# Patient Record
Sex: FEMALE | Employment: UNEMPLOYED | URBAN - METROPOLITAN AREA
[De-identification: names, ages, dates, MRNs, and addresses within clinical notes are randomized per-mention and may not be internally consistent; named-entity substitution may affect disease eponyms.]

---

## 2017-01-27 ENCOUNTER — OFFICE VISIT (OUTPATIENT)
Dept: DERMATOLOGY | Facility: CLINIC | Age: 22
End: 2017-01-27

## 2017-01-27 ENCOUNTER — TELEPHONE (OUTPATIENT)
Dept: DERMATOLOGY | Facility: CLINIC | Age: 22
End: 2017-01-27

## 2017-01-27 DIAGNOSIS — L70.0 ACNE VULGARIS: Primary | ICD-10-CM

## 2017-01-27 RX ORDER — TRETINOIN 0.25 MG/G
CREAM TOPICAL
Qty: 45 G | Refills: 3 | Status: SHIPPED | OUTPATIENT
Start: 2017-01-27 | End: 2021-11-10

## 2017-01-27 RX ORDER — CLINDAMYCIN PHOSPHATE 10 UG/ML
LOTION TOPICAL 2 TIMES DAILY
Qty: 60 ML | Refills: 3 | Status: SHIPPED | OUTPATIENT
Start: 2017-01-27 | End: 2021-11-10

## 2017-01-27 ASSESSMENT — PAIN SCALES - GENERAL: PAINLEVEL: MODERATE PAIN (5)

## 2017-01-27 NOTE — TELEPHONE ENCOUNTER
Prior Authorization Retail Medication Request  Medication/Dose: Tretinoin 0.025% cream 45gm  Diagnosis and ICD code: Acne Vulgaris L70.0  New/Renewal/Insurance Change PA: New  Previously Tried and Failed Therapies: Unsure of names of products. (see note)    Insurance ID (if provided):   Subscriber: [id number] JAROCHO HANNAH       Rel to sub: 01 - Self       Member ID: [id number]       Payor: [facility] Ph: [phone]       Benefit plan: 16[facility]       Group number: [id number]       Member effective dates: from 02/01/16        Insurance Phone (if provided):     Any additional info from fax request:     If you received a fax notification from an outside Pharmacy:  Pharmacy Name:[facility]   Pharmacy #:[phone]  Pharmacy Fax:[phone]

## 2017-01-27 NOTE — Clinical Note
1/27/2017       RE: Kee Jovel  [address], [place] [address]     Dear Colleague,    Thank you for referring your patient, Kee Jovel, to the [facility] DERMATOLOGY at [facility]. Please see a copy of my visit note below.    [facility] Dermatology Note    Dermatology Problem List:  1. Acne vulgaris, mixed mild   -Tretinoin 0.025% cream started 1/27/17  -Topical clindamycin 1% lotion started 1/27/17  -Benzyl peroxide wash started 1/27/17    CC:   Chief Complaint   Patient presents with     Derm Problem     Kee is here today for acne and acne scars     Date of Service: Jan 27, 2017    History of Present Illness:  Ms. Kee Jovel is a 21 year old female who presents for an evaluation of acne and acne scars. Today the patient reports an approximately two year history of acne affecting her face. This acne has been worsening over the last 2-3 months. She has not seen dermatology in the past, but was treated for acne by her PCP in 06/2016. She is unsure what prescriptions she used, but from her description it sounds like a topical retinoid and antibiotic. She used these medications for one month before discontinuing due to side effects including dryness and a burning sensation. The patient also reports that her acne is exacerbated with her menstrual cycle. The patient reports no other lesions of concern at this time.      Past Medical History:   There is no problem list on file for this patient.    No past medical history on file.  No past surgical history on file.  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    Family History:  Sister also with acne    Medications:  No current outpatient prescriptions on file.     Allergies:  No Known Allergies  Review of Systems:  - Skin: As above in HPI. No additional skin concerns.    Physical exam:  Vitals: There were no vitals taken for this visit.  GEN: This is a well developed,  well-nourished female in no acute distress, in a pleasant mood.    SKIN: Focused examination of the face and neck was performed.  - Open and close comedones scattered across cheeks, chin, and forehead  - 2-3 small erythematous papules on chin and forehead  - No other lesions of concern on areas examined.     Impression/Plan:  1. Acne vulgaris, mild: primarily comedonal but with minimal inflammatory component  - Start tretinoin 0.025% cream  - Start clindamycin 1% lotion  - Start benzyl peroxide wash    Follow-up in 3 months, earlier for new or changing lesions    This note was scribed by Augustine Velázquez, MS4, on behalf of Dr. Epi Kapoor MD.    Staff attestation:  The documentation recorded by the scribe accurately reflects the services I personally performed and the decisions I personally made.    Epi Kapoor MD  Staff Dermatologist    Department of Dermatology

## 2017-01-27 NOTE — MR AVS SNAPSHOT
After Visit Summary   1/27/2017    Kee Jovel    MRN: [id number]           Patient Information     Date Of Birth          1995        Visit Information        Provider Department      1/27/2017 8:45 AM Epi Kapoor MD [facility] Dermatology        Today's Diagnoses     Acne vulgaris    -  1       Care Instructions    -To correctly use the tretinoin cream: use a gentle cleanser at the end of the day (like Cetaphil) and pat the skin dry. Do not apply the tretinoin to a wet face. Apply a pea sized amount of the cream evenly across the face. More is not better!   -For the first two weeks, only apply the cream every other night. After that you can use it every night   -It might take up to 3 months to see improvement.  -Start using a benzyl peroxide face wash strength 4-5% every morning or every other morning  -Using a sunscreen regularly will help decrease the darkening of spots on your face.   -Start applying the topical clindamycin every morning  -Return to follow-up in 3 months.        Follow-ups after your visit        Your next 10 appointments already scheduled     Apr 28, 2017  3:00 PM   (Arrive by 2:45 PM)   Return Visit with Epi Kapoor MD   [facility] Dermatology ([facility])    [address] [place] [id number] [phone]              Who to contact     Please call your clinic at [phone] to:    Ask questions about your health    Make or cancel appointments    Discuss your medicines    Learn about your test results    Speak to your doctor   If you have compliments or concerns about an experience at your clinic, or if you wish to file a complaint, please contact [facility] Patient Relations at [phone] or email us at [email or web address]         Additional Information About Your Visit        Care EveryWhere ID     This is your Care EveryWhere ID. This could be used by other  organizations to access your [facility] medical records  [id number]         Blood Pressure from Last 3 Encounters:   No data found for BP    Weight from Last 3 Encounters:   No data found for Wt              Today, you had the following     No orders found for display         Today's Medication Changes          These changes are accurate as of: 1/27/17  8:56 AM.  If you have any questions, ask your nurse or doctor.               Start taking these medicines.        Dose/Directions    clindamycin 1 % lotion   Commonly known as:  CLEOCIN T   Used for:  Acne vulgaris   Started by:  Epi Kapoor MD        Apply topically 2 times daily   Quantity:  60 mL   Refills:  3       tretinoin 0.025 % cream   Commonly known as:  RETIN-A   Used for:  Acne vulgaris   Started by:  Epi Kapoor MD        Apply to clean dry face every other night for two weeks, then nightly thereafter.   Quantity:  45 g   Refills:  3            Where to get your medicines      These medications were sent to [id number] - [facility] [address] [address]     Phone:  [phone]    - clindamycin 1 % lotion  - tretinoin 0.025 % cream             Primary Care Provider    No Pcp Confirmed       No address on file        Thank you!     Thank you for choosing [facility] DERMATOLOGY  for your care. Our goal is always to provide you with excellent care. Hearing back from our patients is one way we can continue to improve our services. Please take a few minutes to complete the written survey that you may receive in the mail after your visit with us. Thank you!             Your Updated Medication List - Protect others around you: Learn how to safely use, store and throw away your medicines at www.disposemymeds.org.          This list is accurate as of: 1/27/17  8:56 AM.  Always use your most recent med list.                   Brand Name Dispense Instructions for use    clindamycin 1 % lotion    CLEOCIN T    60 mL     Apply topically 2 times daily       tretinoin 0.025 % cream    RETIN-A    45 g    Apply to clean dry face every other night for two weeks, then nightly thereafter.

## 2017-01-27 NOTE — NURSING NOTE
Dermatology Rooming Note    Kee Jovel's goals for this visit include:   Chief Complaint   Patient presents with     Derm Problem     Kee is here today for acne and acne scars     Angela Nicole LPN

## 2017-01-27 NOTE — PATIENT INSTRUCTIONS
-To correctly use the tretinoin cream: use a gentle cleanser at the end of the day (like Cetaphil) and pat the skin dry. Do not apply the tretinoin to a wet face. Apply a pea sized amount of the cream evenly across the face. More is not better!   -For the first two weeks, only apply the cream every other night. After that you can use it every night   -It might take up to 3 months to see improvement.  -Start using a benzyl peroxide face wash strength 4-5% every morning or every other morning  -Using a sunscreen regularly will help decrease the darkening of spots on your face.   -Start applying the topical clindamycin every morning  -Return to follow-up in 3 months.

## 2017-01-27 NOTE — PROGRESS NOTES
[facility] Dermatology Note    Dermatology Problem List:  1. Acne vulgaris, mixed mild   -Tretinoin 0.025% cream started 1/27/17  -Topical clindamycin 1% lotion started 1/27/17  -Benzyl peroxide wash started 1/27/17    CC:   Chief Complaint   Patient presents with     Derm Problem     Kee is here today for acne and acne scars     Date of Service: Jan 27, 2017    History of Present Illness:  Ms. Kee Jovel is a 21 year old female who presents for an evaluation of acne and acne scars. Today the patient reports an approximately two year history of acne affecting her face. This acne has been worsening over the last 2-3 months. She has not seen dermatology in the past, but was treated for acne by her PCP in 06/2016. She is unsure what prescriptions she used, but from her description it sounds like a topical retinoid and antibiotic. She used these medications for one month before discontinuing due to side effects including dryness and a burning sensation. The patient also reports that her acne is exacerbated with her menstrual cycle. The patient reports no other lesions of concern at this time.      Past Medical History:   There is no problem list on file for this patient.    No past medical history on file.  No past surgical history on file.  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    Family History:  Sister also with acne    Medications:  No current outpatient prescriptions on file.     Allergies:  No Known Allergies  Review of Systems:  - Skin: As above in HPI. No additional skin concerns.    Physical exam:  Vitals: There were no vitals taken for this visit.  GEN: This is a well developed, well-nourished female in no acute distress, in a pleasant mood.    SKIN: Focused examination of the face and neck was performed.  - Open and close comedones scattered across cheeks, chin, and forehead  - 2-3 small erythematous papules on chin and forehead  - No other lesions of  concern on areas examined.     Impression/Plan:  1. Acne vulgaris, mild: primarily comedonal but with minimal inflammatory component  - Start tretinoin 0.025% cream  - Start clindamycin 1% lotion  - Start benzyl peroxide wash    Follow-up in 3 months, earlier for new or changing lesions    This note was scribed by Augustine Velázquez, MS4, on behalf of Dr. Epi Kapoor MD.    Staff attestation:  The documentation recorded by the scribe accurately reflects the services I personally performed and the decisions I personally made.    Epi Kapoor MD  Staff Dermatologist    Department of Dermatology

## 2017-02-16 NOTE — TELEPHONE ENCOUNTER
[facility] Prior Authorization Team   Phone: [phone]  Fax: [phone]      PA Initiation    Medication: Tretinoin 0.025% cream 45gm  Insurance Company: [employer] - Phone [phone] Fax [phone]  Pharmacy Filling the Rx: [id number] - [place], [place] - [address]  Filling Pharmacy Phone: [phone]  Filling Pharmacy Fax: [phone]  Start Date: 2/16/2017

## 2017-02-20 NOTE — TELEPHONE ENCOUNTER
Prior Authorization Approval    Authorization Effective Date: 2/17/2017  Authorization Expiration Date: 8/16/2017  Medication: Tretinoin 0.025% cream 45gm - Approved   Approved Dose/Quantity:   Reference #:     Insurance Company: [employer] - Phone [phone] Fax [phone]  Expected CoPay: 1.00     CoPay Card Available:      Foundation Assistance Needed:    Which Pharmacy is filling the prescription (Not needed for infusion/clinic administered): [id number] - [place] - [address]  Pharmacy Notified: Yes  Patient Notified: Yes

## 2019-09-17 ENCOUNTER — HOSPITAL ENCOUNTER (EMERGENCY)
Facility: CLINIC | Age: 24
Discharge: HOME | End: 2019-09-18
Attending: EMERGENCY MEDICINE | Admitting: EMERGENCY MEDICINE
Payer: COMMERCIAL

## 2019-09-17 DIAGNOSIS — J32.9 SINUSITIS, UNSPECIFIED CHRONICITY, UNSPECIFIED LOCATION: ICD-10-CM

## 2019-09-17 PROBLEM — Z22.7 LATENT TUBERCULOSIS: Status: ACTIVE | Noted: 2017-07-12

## 2019-09-17 PROBLEM — K21.9 GASTROESOPHAGEAL REFLUX DISEASE: Status: ACTIVE | Noted: 2019-02-25

## 2019-09-17 LAB
ALBUMIN UR-MCNC: NEGATIVE MG/DL
ANION GAP SERPL CALCULATED.3IONS-SCNC: 7 MMOL/L (ref 3–14)
APPEARANCE UR: CLEAR
BASOPHILS # BLD AUTO: 0 10E9/L (ref 0–0.2)
BASOPHILS NFR BLD AUTO: 0.2 %
BILIRUB UR QL STRIP: NEGATIVE
BUN SERPL-MCNC: 15 MG/DL (ref 7–30)
CALCIUM SERPL-MCNC: 8.7 MG/DL (ref 8.5–10.1)
CHLORIDE SERPL-SCNC: 104 MMOL/L (ref 94–109)
CO2 SERPL-SCNC: 26 MMOL/L (ref 20–32)
COLOR UR AUTO: NORMAL
CREAT SERPL-MCNC: 0.72 MG/DL (ref 0.52–1.04)
DEPRECATED S PYO AG THROAT QL EIA: NORMAL
DIFFERENTIAL METHOD BLD: NORMAL
EOSINOPHIL # BLD AUTO: 0.1 10E9/L (ref 0–0.7)
EOSINOPHIL NFR BLD AUTO: 0.9 %
ERYTHROCYTE [DISTWIDTH] IN BLOOD BY AUTOMATED COUNT: 12.8 % (ref 10–15)
GFR SERPL CREATININE-BSD FRML MDRD: >90 ML/MIN/{1.73_M2}
GLUCOSE SERPL-MCNC: 86 MG/DL (ref 70–99)
GLUCOSE UR STRIP-MCNC: NEGATIVE MG/DL
HCG UR QL: NEGATIVE
HCT VFR BLD AUTO: 39 % (ref 35–47)
HETEROPH AB SER QL: NEGATIVE
HGB BLD-MCNC: 12.7 G/DL (ref 11.7–15.7)
HGB UR QL STRIP: NEGATIVE
IMM GRANULOCYTES # BLD: 0 10E9/L (ref 0–0.4)
IMM GRANULOCYTES NFR BLD: 0.4 %
INTERNAL QC OK POCT: YES
KETONES UR STRIP-MCNC: NEGATIVE MG/DL
LEUKOCYTE ESTERASE UR QL STRIP: NEGATIVE
LYMPHOCYTES # BLD AUTO: 2.5 10E9/L (ref 0.8–5.3)
LYMPHOCYTES NFR BLD AUTO: 25.6 %
MCH RBC QN AUTO: 29.4 PG (ref 26.5–33)
MCHC RBC AUTO-ENTMCNC: 32.6 G/DL (ref 31.5–36.5)
MCV RBC AUTO: 90 FL (ref 78–100)
MONOCYTES # BLD AUTO: 0.6 10E9/L (ref 0–1.3)
MONOCYTES NFR BLD AUTO: 6.2 %
NEUTROPHILS # BLD AUTO: 6.6 10E9/L (ref 1.6–8.3)
NEUTROPHILS NFR BLD AUTO: 66.7 %
NITRATE UR QL: NEGATIVE
NRBC # BLD AUTO: 0 10*3/UL
NRBC BLD AUTO-RTO: 0 /100
PH UR STRIP: 6.5 PH (ref 5–7)
PLATELET # BLD AUTO: 310 10E9/L (ref 150–450)
POTASSIUM SERPL-SCNC: 3.4 MMOL/L (ref 3.4–5.3)
RBC # BLD AUTO: 4.32 10E12/L (ref 3.8–5.2)
RBC #/AREA URNS AUTO: 0 /HPF (ref 0–2)
SODIUM SERPL-SCNC: 137 MMOL/L (ref 133–144)
SOURCE: NORMAL
SP GR UR STRIP: 1.02 (ref 1–1.03)
SPECIMEN SOURCE: NORMAL
SQUAMOUS #/AREA URNS AUTO: 1 /HPF (ref 0–1)
UROBILINOGEN UR STRIP-MCNC: NORMAL MG/DL (ref 0–2)
WBC # BLD AUTO: 9.8 10E9/L (ref 4–11)
WBC #/AREA URNS AUTO: <1 /HPF (ref 0–5)

## 2019-09-17 PROCEDURE — 86308 HETEROPHILE ANTIBODY SCREEN: CPT | Performed by: EMERGENCY MEDICINE

## 2019-09-17 PROCEDURE — 87081 CULTURE SCREEN ONLY: CPT | Performed by: EMERGENCY MEDICINE

## 2019-09-17 PROCEDURE — 99284 EMERGENCY DEPT VISIT MOD MDM: CPT | Mod: Z6 | Performed by: EMERGENCY MEDICINE

## 2019-09-17 PROCEDURE — 80048 BASIC METABOLIC PNL TOTAL CA: CPT | Performed by: EMERGENCY MEDICINE

## 2019-09-17 PROCEDURE — 96374 THER/PROPH/DIAG INJ IV PUSH: CPT

## 2019-09-17 PROCEDURE — 99284 EMERGENCY DEPT VISIT MOD MDM: CPT | Mod: 25

## 2019-09-17 PROCEDURE — 81025 URINE PREGNANCY TEST: CPT | Performed by: EMERGENCY MEDICINE

## 2019-09-17 PROCEDURE — 87880 STREP A ASSAY W/OPTIC: CPT | Performed by: EMERGENCY MEDICINE

## 2019-09-17 PROCEDURE — 25000128 H RX IP 250 OP 636: Performed by: EMERGENCY MEDICINE

## 2019-09-17 PROCEDURE — 96375 TX/PRO/DX INJ NEW DRUG ADDON: CPT

## 2019-09-17 PROCEDURE — 96361 HYDRATE IV INFUSION ADD-ON: CPT

## 2019-09-17 PROCEDURE — 85025 COMPLETE CBC W/AUTO DIFF WBC: CPT | Performed by: EMERGENCY MEDICINE

## 2019-09-17 PROCEDURE — 81001 URINALYSIS AUTO W/SCOPE: CPT | Performed by: EMERGENCY MEDICINE

## 2019-09-17 RX ORDER — KETOROLAC TROMETHAMINE 15 MG/ML
15 INJECTION, SOLUTION INTRAMUSCULAR; INTRAVENOUS ONCE
Status: COMPLETED | OUTPATIENT
Start: 2019-09-17 | End: 2019-09-17

## 2019-09-17 RX ORDER — AZITHROMYCIN 250 MG/1
TABLET, FILM COATED ORAL
Qty: 6 TABLET | Refills: 0 | Status: SHIPPED | OUTPATIENT
Start: 2019-09-17 | End: 2019-09-22

## 2019-09-17 RX ORDER — ONDANSETRON 2 MG/ML
4 INJECTION INTRAMUSCULAR; INTRAVENOUS ONCE
Status: COMPLETED | OUTPATIENT
Start: 2019-09-17 | End: 2019-09-17

## 2019-09-17 RX ADMIN — ONDANSETRON 4 MG: 2 INJECTION INTRAMUSCULAR; INTRAVENOUS at 22:53

## 2019-09-17 RX ADMIN — SODIUM CHLORIDE 1000 ML: 9 INJECTION, SOLUTION INTRAVENOUS at 22:42

## 2019-09-17 RX ADMIN — KETOROLAC TROMETHAMINE 15 MG: 15 INJECTION, SOLUTION INTRAMUSCULAR; INTRAVENOUS at 22:42

## 2019-09-17 ASSESSMENT — ENCOUNTER SYMPTOMS
DYSURIA: 0
NAUSEA: 1
HEADACHES: 1
DIARRHEA: 0
ABDOMINAL PAIN: 0
VOMITING: 0
COUGH: 1

## 2019-09-17 NOTE — ED AVS SNAPSHOT
[facility], [place], Emergency Department  [address] [address]  Phone:  [phone]                                    Kee Jovel   MRN: [id number]    Department:  [facility], Emergency Department   Date of Visit:  9/17/2019           After Visit Summary Signature Page    I have received my discharge instructions, and my questions have been answered. I have discussed any challenges I see with this plan with the nurse or doctor.    ..........................................................................................................................................  Patient/Patient Representative Signature      ..........................................................................................................................................  Patient Representative Print Name and Relationship to Patient    ..................................................               ................................................  Date                                   Time    ..........................................................................................................................................  Reviewed by Signature/Title    ...................................................              ..............................................  Date                                               Time          22EPIC Rev 08/18

## 2019-09-18 VITALS
SYSTOLIC BLOOD PRESSURE: 115 MMHG | OXYGEN SATURATION: 100 % | DIASTOLIC BLOOD PRESSURE: 80 MMHG | HEART RATE: 79 BPM | TEMPERATURE: 97.9 F | WEIGHT: 189 LBS | RESPIRATION RATE: 16 BRPM

## 2019-09-18 NOTE — ED TRIAGE NOTES
Pt arrives with complaints of body aches, headache, abdominal pain & nausea, and sweating since Sunday. Denies diarrhea or vomiting. She is currently on antibiotics (amoxicillin 500 mg) for a sinus infection. She states she feels feverish but was not sure if she had a fever. She denies chest pain or shortness of breath. Denies possibility of pregnancy. LMP 8/19/19

## 2019-09-18 NOTE — RESULT ENCOUNTER NOTE
Preliminary Beta strep group A r/o culture is PENDING and/or NEGATIVE at this time.   No changes in treatment per [facility] Strep protocol.

## 2019-09-18 NOTE — ED PROVIDER NOTES
[facility] EMERGENCY DEPARTMENT ([facility])  9/17/19  History     Chief Complaint   Patient presents with     Headache     Abdominal Pain     Nausea     The history is provided by the patient and medical records.     Kee Jovel is a 23 year old female with a past medical history significant for latent tuberculosis and gastroesophageal reflux disease who presents to the Emergency Department for evaluation of headache, nausea, congestion, and body aches.    Patient reports that her first symptom was a headache that began 3 weeks ago. Her symptoms then progressed on Sunday to include nasal congestion and a mild cough. She states that she scheduled an urgent care appointment on 9/5/19 to address her symptoms. Per chart, she was seen at [facility] and complained of bilateral ear pain, cough, nasal congestion, and facial pressure. She states that she was diagnosed with a sinus infection and was given a 10-day course of 500 mg augmentin but notes little to no improvement. Patient notes that she began feeling fever-like symptoms around 3:00 PM this afternoon accompanied with nausea. She took a Tylenol but observed little to no improvement so she decided to visit the ED.         Patient denies any vomiting, dysuria, abdominal pain, or diarrhea. She denies having any sick contacts or going on any travels abroad.     PAST MEDICAL HISTORY: History reviewed. No pertinent past medical history.    PAST SURGICAL HISTORY: History reviewed. No pertinent surgical history.    FAMILY HISTORY:   Family History   Problem Relation Age of Onset     Melanoma No family hx of      Skin Cancer No family hx of        SOCIAL HISTORY:   Social History     Tobacco Use     Smoking status: Never Smoker     Smokeless tobacco: Never Used   Substance Use Topics     Alcohol use: Not on file       Discharge Medication List as of 9/17/2019 11:54 PM      START taking these medications    Details   azithromycin (ZITHROMAX Z-JAZZY) 250 MG tablet Two  tablets on the first day, then one tablet daily for the next 4 days, Disp-6 tablet, R-0, Local Print         CONTINUE these medications which have NOT CHANGED    Details   amoxicillin-clavulanate (AUGMENTIN) 875-125 MG tablet TAKE ONE TABLET BY MOUTH TWICE A DAY FOR 10 DAYS, Historical      clindamycin (CLEOCIN T) 1 % lotion Apply topically 2 times dailyDisp-60 mL, [id number]      tretinoin (RETIN-A) 0.025 % cream Apply to clean dry face every other night for two weeks, then nightly thereafter.Disp-45 g, [id number]                Allergies   Allergen Reactions     Pollen Extract Other (See Comments)         I have reviewed the Medications, Allergies, Past Medical and Surgical History, and Social History in the Epic system.    Review of Systems   HENT: Positive for congestion.    Respiratory: Positive for cough (mild).    Gastrointestinal: Positive for nausea. Negative for abdominal pain, diarrhea and vomiting.   Genitourinary: Negative for dysuria.   Neurological: Positive for headaches.       Physical Exam   BP: 115/80  Pulse: 79  Temp: 97.9  F (36.6  C)  Weight: 85.7 kg (189 lb)  SpO2: 100 %      Physical Exam   Constitutional: She is oriented to person, place, and time. No distress.   HENT:   Head: Normocephalic and atraumatic.   Right Ear: External ear normal.   Left Ear: External ear normal.   Mouth/Throat: Oropharynx is clear and moist.   Eyes: Pupils are equal, round, and reactive to light. Conjunctivae are normal.   Neck: Normal range of motion. Neck supple.   Cardiovascular: Normal rate and intact distal pulses.   Pulmonary/Chest: Effort normal. No respiratory distress. She has no wheezes. She has no rales. She exhibits no tenderness.   Abdominal: Soft. She exhibits no distension. There is no tenderness. There is no rebound and no guarding.   Musculoskeletal: Normal range of motion. She exhibits no edema.   Neurological: She is alert and oriented to person, place, and time.   Skin: Skin is warm and  dry. She is not diaphoretic.   Psychiatric: She has a normal mood and affect. Her behavior is normal. Thought content normal.   Nursing note and vitals reviewed.      ED Course   10:07 PM  The patient was seen and examined by Kimani Pacheco MD in [facility].        Procedures             Critical Care time:  none             Labs Ordered and Resulted from Time of ED Arrival Up to the Time of Departure from the ED   HCG QUAL URINE POCT - Normal   ROUTINE UA WITH MICROSCOPIC            Assessments & Plan (with Medical Decision Making)   This is a 23-year-old female who presents with symptoms of malaise and congestion for the past several weeks.  She complained of a headache but her examination was benign with no evidence of serious bacterial infection.  Her lab evaluation was normal with no leukocytosis.  She was treated with normal saline bolus and IV Toradol and felt improved.  Given her ongoing symptoms we will treat with Azithromycin for sinusitis.     I have reviewed the nursing notes.    I have reviewed the findings, diagnosis, plan and need for follow up with the patient.    New Prescriptions    No medications on file       Final diagnoses:   None     Jamila SALTER, am serving as a trained medical scribe to document services personally performed by Kimani Pacheco MD, based on the provider's statements to me.      IKimani MD, was physically present and have reviewed and verified the accuracy of this note documented by Jamila Ricardo.     9/17/2019   [facility], [place], EMERGENCY DEPARTMENT     Kimani Pacheco MD  09/18/19 [id number]

## 2019-09-20 LAB
BACTERIA SPEC CULT: NORMAL
Lab: NORMAL
SPECIMEN SOURCE: NORMAL

## 2021-06-07 ENCOUNTER — TRANSFERRED RECORDS (OUTPATIENT)
Dept: HEALTH INFORMATION MANAGEMENT | Facility: CLINIC | Age: 26
End: 2021-06-07
Payer: COMMERCIAL

## 2021-06-08 ENCOUNTER — OFFICE VISIT (OUTPATIENT)
Dept: PEDIATRICS | Facility: CLINIC | Age: 26
End: 2021-06-08
Payer: COMMERCIAL

## 2021-06-08 ENCOUNTER — ANCILLARY PROCEDURE (OUTPATIENT)
Dept: GENERAL RADIOLOGY | Facility: CLINIC | Age: 26
End: 2021-06-08
Attending: PHYSICIAN ASSISTANT
Payer: COMMERCIAL

## 2021-06-08 VITALS
HEART RATE: 103 BPM | DIASTOLIC BLOOD PRESSURE: 70 MMHG | WEIGHT: 226.4 LBS | SYSTOLIC BLOOD PRESSURE: 114 MMHG | RESPIRATION RATE: 14 BRPM | TEMPERATURE: 99.4 F | OXYGEN SATURATION: 100 %

## 2021-06-08 DIAGNOSIS — M54.50 LUMBAR PAIN: ICD-10-CM

## 2021-06-08 DIAGNOSIS — M25.562 ACUTE PAIN OF LEFT KNEE: Primary | ICD-10-CM

## 2021-06-08 DIAGNOSIS — M54.2 NECK PAIN: ICD-10-CM

## 2021-06-08 DIAGNOSIS — M25.562 ACUTE PAIN OF LEFT KNEE: ICD-10-CM

## 2021-06-08 PROCEDURE — 99204 OFFICE O/P NEW MOD 45 MIN: CPT | Performed by: PHYSICIAN ASSISTANT

## 2021-06-08 PROCEDURE — 73562 X-RAY EXAM OF KNEE 3: CPT | Mod: LT | Performed by: RADIOLOGY

## 2021-06-08 NOTE — PROGRESS NOTES
Assessment & Plan   Acute pain of left knee  Tendonitis. ACE bandage. RICE.  - XR Knee Left 3 Views; Future    Lumbar pain  S/p epidural. Follow up with [facility].  - Orthopedic & Spine  Referral; Future    Neck pain  Causing tension headache. Proceed with physical therapy.   - [facility] REFERR[facility]; Future    VANDANA Garcia [facility] CHA Sweet is a 25 year old who presents for the following health issues:    HPI   Musculoskeletal problem/pain  Onset/Duration: x 3 week  Description  Location: knee - left  Joint Swelling: YES  Redness: no  Pain: YES  Warmth: no  Intensity:  6/10  Progression of Symptoms:  same  Accompanying signs and symptoms:   Fevers: no  Numbness/tingling/weakness: no  No locking or giving way of knee  History  Trauma to the area: no  Recent illness:  no  Previous similar problem: no  Previous evaluation:  no  Precipitating or alleviating factors:  Aggravating factors include: overuse and walking or physical exercise.   Therapies tried and outcome: heating pad and tylenol- temporary relief.     Back Pain  Onset/Duration: 5 months ago s/p epidural  Description:   Location of pain: low back along the spine and neck spine   Character of pain: sharp  Pain radiation: none  New numbness or weakness in legs, not attributed to pain: no   Intensity: 8-9/10  Progression of Symptoms: intermittent  History:   Specific cause: Started after delivery of baby  Pain interferes with job: YES  History of back problems: no prior back problems  Any previous MRI or X-rays: None  Sees a specialist for back pain: Yes- seeing physical therapy- at [facility] with Lien Alvarado (PIYUSH)    Alleviating factors:   Improved by: Physical Therapy, heating pad, tylenol   Precipitating factors:  Worsened by: Bending and worse in the AM  Therapies tried and outcome: acetaminophen (Tylenol), heat and Physical Therapy    NECK PAIN: 3 weeks of neck pain. Into scalp. Tension  headache.     Review of Systems   Constitutional, HEENT, cardiovascular, pulmonary, gi and gu systems are negative, except as otherwise noted.      Objective    /70 (BP Location: Right arm, Patient Position: Sitting, Cuff Size: Adult Large)   Pulse 103   Temp 99.4  F (37.4  C) (Tympanic)   Resp 14   Wt 102.7 kg (226 lb 6.4 oz)   SpO2 100%   There is no height or weight on file to calculate BMI.  Physical Exam   ORTHO: Knee Exam: Inspection: AP/lateral alignment normal  Tender: medial distal knee  Active Range of Motion: all normal with pain  Strength: full    Cervical Spine Exam: Inspection: normal cervical lordosis  Tender:  left trapezius muscles, right trapezius muscles insertion  Range of Motion:  Full ROM of cervical spine  Strength: Full strength of all neck muscles    Lumber/Thoracic Spine Exam: Tender:  lumbar spinous processes  Range of Motion: limited due to pain  Strength:  Full    Results for orders placed or performed in visit on 06/08/21   XR Knee Left 3 Views     Status: None    Narrative    KNEE LEFT THREE VIEW   6/8/2021 2:33 PM     HISTORY: Acute pain of left knee.    COMPARISON: None.      Impression    IMPRESSION: Normal joint spacing and alignment. No fracture or  effusion.    NAOMIE CARNES MD

## 2021-06-16 ENCOUNTER — ANCILLARY PROCEDURE (OUTPATIENT)
Dept: GENERAL RADIOLOGY | Facility: CLINIC | Age: 26
End: 2021-06-16
Attending: PHYSICAL MEDICINE & REHABILITATION
Payer: COMMERCIAL

## 2021-06-16 ENCOUNTER — OFFICE VISIT (OUTPATIENT)
Dept: PALLIATIVE MEDICINE | Facility: CLINIC | Age: 26
End: 2021-06-16
Attending: PHYSICIAN ASSISTANT
Payer: COMMERCIAL

## 2021-06-16 VITALS — OXYGEN SATURATION: 98 % | SYSTOLIC BLOOD PRESSURE: 109 MMHG | DIASTOLIC BLOOD PRESSURE: 72 MMHG | HEART RATE: 90 BPM

## 2021-06-16 DIAGNOSIS — M54.2 CHRONIC NECK PAIN: ICD-10-CM

## 2021-06-16 DIAGNOSIS — M54.50 CHRONIC BILATERAL LOW BACK PAIN WITHOUT SCIATICA: ICD-10-CM

## 2021-06-16 DIAGNOSIS — G89.29 CHRONIC BILATERAL LOW BACK PAIN WITHOUT SCIATICA: ICD-10-CM

## 2021-06-16 DIAGNOSIS — G89.29 CHRONIC NECK PAIN: ICD-10-CM

## 2021-06-16 DIAGNOSIS — M79.18 MYOFASCIAL PAIN: Primary | ICD-10-CM

## 2021-06-16 PROCEDURE — 99203 OFFICE O/P NEW LOW 30 MIN: CPT | Performed by: PHYSICAL MEDICINE & REHABILITATION

## 2021-06-16 PROCEDURE — 72100 X-RAY EXAM L-S SPINE 2/3 VWS: CPT | Performed by: RADIOLOGY

## 2021-06-16 PROCEDURE — 72040 X-RAY EXAM NECK SPINE 2-3 VW: CPT | Performed by: RADIOLOGY

## 2021-06-16 RX ORDER — METHOCARBAMOL 500 MG/1
500 TABLET, FILM COATED ORAL 4 TIMES DAILY PRN
Qty: 120 TABLET | Refills: 0 | Status: SHIPPED | OUTPATIENT
Start: 2021-06-16 | End: 2021-11-10

## 2021-06-16 ASSESSMENT — PAIN SCALES - GENERAL: PAINLEVEL: SEVERE PAIN (7)

## 2021-06-16 NOTE — PROGRESS NOTES
[facility] Consultation    Date of visit: 2021    Assessment:  Kee Jovel is a 25 year old female with a past medical history significant for GERD who presents with complaints of:    1. Chronic neck and back pain: symptoms are primarily axial neck and low back pain. Suspect etiology of pain is myofascial in nature.     Plan:  The following recommendations were given to the patient. Diagnosis, treatment options, risks, benefits, and alternatives were discussed, and all questions were answered. The patient expressed understanding of the plan for management.     1. Therapies:  Continue PT  2. Self Care Recommendations:   1. Discussed trying a TENS unit  2. Deep tissue massage may be helpful  3. Diagnostic Studies: will get baseline x-rays of cervical and lumbar spine today. Will my chart the results.   4. Medication Management:      1. Start methocarbamol 500 mg QID prn  2. Continue using OTC topical medications  5. Procedures recommended: None  6. Recommend trying chiropractic treatment  7. Follow up: 6 weeks    Reason for consultation:    Primary Care Provider is Clinic, [facility].    Kee Jovel is a 25 year old female with a history of GERD who I was asked to see in consultation by Shahram Espino for evaluation of neck and back pain.     Review of Electronic Chart: Today I have also reviewed available medical information in the patient's medical record at [facility] including relevant provider notes, laboratory work, and imaging.     Chief Complaint:    Chief Complaint   Patient presents with     Pain     History:  Kee Jovel is a 25 year old female who presents for initial evaluation of chief pain complaint of  back pain. 5 months ago had a . Pain started after having an epidural. Worsened with activity. Basic household chores are aggravating. Cannot stand prolonged time. Pain starts in the neck and goes to mid back and then low back.  Pain in  bilateral low back to buttock and has some headache associated with this. Some days low back is worse and other days neck is worse. Has referral inot lateral right thigh to knee. No symptoms distally and nothing on the left side. Pain is throbbing in quality. In mornings hard to stand up straight and takes coupule minutes to stand straight upright.     Tylenol and ibuprofen not helping anymore    Severity/Intensity: 7/10 at worst, 7-8/10 at best, 7/10 on average    Red Flags: The patient denies bowel or bladder incontinence, parasthesias, weakness, saddle anesthesia, unintentional weight loss, or fever/chills/sweats.     Medications:       Current pain medications:  Tylenol 1,000 mg daily prn  Ibuprofen 400 mg daily prn  Icy/hot - [facility]       Past Pain Treatments:  PT: Yes  - currently doing and is making some progress. Has been going for 2 months. Initially was making progress and then pain increased again.   TENs Unit: No  Injections: No  Self-care:   Yes - heat - [facility] temporarily  Surgeries related to pain: No  Alternative Therapies:    Chiropractic: No    Acupuncture: No  Other: None    Diagnostic tests:  None    EMG/Testing:  NA    Labs:   Creatinine 0.68    Past Medical History:  Reviewed     Past Surgical History:      Medications:  Current Outpatient Medications   Medication Sig Dispense Refill     amoxicillin-clavulanate (AUGMENTIN) 875-125 MG tablet TAKE ONE TABLET BY MOUTH TWICE A DAY FOR 10 DAYS       clindamycin (CLEOCIN T) 1 % lotion Apply topically 2 times daily 60 mL 3     tretinoin (RETIN-A) 0.025 % cream Apply to clean dry face every other night for two weeks, then nightly thereafter. 45 g 3     Allergies:     Allergies   Allergen Reactions     Pollen Extract Other (See Comments)     Family history:  Family History   Problem Relation Age of Onset     Melanoma No family hx of      Skin Cancer No family hx of      Social History:  Home situation: Lives in [place], [place] with  and 5 month old  son  Occupation/Schooling: She was working before having her son. Now still off of work.   Tobacco use: None  Drug use: None  Alcohol use: None    Physical Exam:  Vitals:    06/16/21 1324   BP: 109/72   Pulse: 90   SpO2: 98%     Exam:  Constitutional: Well developed, well nourished, appears stated age.  HEENT: Head atraumatic, normocephalic. Eyes without conjunctival injection or jaundice. Neck supple. No obvious neck masses.  Respiratory: breathing unlabored   Skin: No rash, lesions of exposed skin.   Psychiatric/mental status: Alert, without lethargy or stupor. Speech fluent. Appropriate affect. Mood normal. Able to follow commands without difficulty.     Musculoskeletal exam:  Gait/Station/Posture:   Normal stance, arm swing, and stride; no antalgia  Normal bulk and tone. Unremarkable spinal curvature.     Cervical spine:  Range of motion within normal limits   Myofascial tenderness: tender to palpation bilateral traps and paraspinals  No focal spinous process tenderness.   Spurling's negative bilaterally.      Lumbar spine:  Range of motion within normal limits   Myofascial tenderness:  Tender in paraspinals    Neurologic exam:  CN:  Cranial nerves 2-12 are grossly intact  Motor Strength:  5/5 symmetric UE and LE strength    Deisy Montgomery MD  [facility]       BILLING TIME DOCUMENTATION:   The total TIME spent on this patient on the date of the encounter/appointment was 40 minutes.      TOTAL TIME includes:   Time spent preparing to see the patient (reviewing records and tests)   Time spent face to face (or over the phone) with the patient   Time spent ordering tests, medications, procedures and referrals   Time spent documenting clinical information in Epic

## 2021-06-16 NOTE — PATIENT INSTRUCTIONS
1. Start methocarbamol (Robaxin) 500 mg (1 tablet) four times daily as needed. Can be sedating.  Do not drive until you know how the medication affects you.  2. Continue utilizing topical medications for the muscle pain in the neck.   3. Continue with PT.   4. Recommend trying chiropractic treatment  5. Will get baseline x-rays of the neck and low back today. I will my chart you with the results.   6. Deep tissue massage can be helpful.  7. You can consider purchasing a TENS unit online to see if it provides some benefit.   8. Follow up with me in 6 weeks.     Deisy Montgomery MD  [facility]     ----------------------------------------------------------------  Clinic Number:  [phone]     Call with any questions about your care and for scheduling assistance.     Calls are returned Monday through Friday between 8 AM and 4:30 PM. We usually get back to you within 2 business days depending on the issue/request.    If we are prescribing your medications:    For opioid medication refills, call the clinic or send a [facility] message 7 days in advance.  Please include:    Name of requested medication    Name of the pharmacy.    For non-opioid medications, call your pharmacy directly to request a refill. Please allow 3-4 days to be processed.     Per [place] State Law:    All controlled substance prescriptions must be filled within 30 days of being written.      For those controlled substances allowing refills, pickup must occur within 30 days of last fill.      We believe regular attendance is key to your success in our program!      Any time you are unable to keep your appointment we ask that you call us at least 24 hours in advance to cancel.This will allow us to offer the appointment time to another patient.     Multiple missed appointments may lead to dismissal from the clinic.

## 2021-06-19 ENCOUNTER — HEALTH MAINTENANCE LETTER (OUTPATIENT)
Age: 26
End: 2021-06-19

## 2021-10-09 ENCOUNTER — HEALTH MAINTENANCE LETTER (OUTPATIENT)
Age: 26
End: 2021-10-09

## 2021-11-10 ENCOUNTER — OFFICE VISIT (OUTPATIENT)
Dept: URGENT CARE | Facility: URGENT CARE | Age: 26
End: 2021-11-10
Payer: COMMERCIAL

## 2021-11-10 VITALS
OXYGEN SATURATION: 98 % | HEART RATE: 78 BPM | SYSTOLIC BLOOD PRESSURE: 126 MMHG | RESPIRATION RATE: 20 BRPM | DIASTOLIC BLOOD PRESSURE: 77 MMHG | TEMPERATURE: 98 F

## 2021-11-10 DIAGNOSIS — R30.0 DYSURIA: Primary | ICD-10-CM

## 2021-11-10 LAB
ALBUMIN UR-MCNC: NEGATIVE MG/DL
APPEARANCE UR: CLEAR
BACTERIA #/AREA URNS HPF: ABNORMAL /HPF
BILIRUB UR QL STRIP: NEGATIVE
CLUE CELLS: ABNORMAL
COLOR UR AUTO: YELLOW
GLUCOSE UR STRIP-MCNC: NEGATIVE MG/DL
HGB UR QL STRIP: ABNORMAL
KETONES UR STRIP-MCNC: NEGATIVE MG/DL
LEUKOCYTE ESTERASE UR QL STRIP: NEGATIVE
NITRATE UR QL: NEGATIVE
PH UR STRIP: 6.5 [PH] (ref 5–7)
RBC #/AREA URNS AUTO: ABNORMAL /HPF
SP GR UR STRIP: 1.02 (ref 1–1.03)
SQUAMOUS #/AREA URNS AUTO: ABNORMAL /LPF
TRICHOMONAS, WET PREP: ABNORMAL
UROBILINOGEN UR STRIP-ACNC: 0.2 E.U./DL
WBC #/AREA URNS AUTO: ABNORMAL /HPF
WBC'S/HIGH POWER FIELD, WET PREP: ABNORMAL
YEAST, WET PREP: ABNORMAL

## 2021-11-10 PROCEDURE — 81001 URINALYSIS AUTO W/SCOPE: CPT

## 2021-11-10 PROCEDURE — 99213 OFFICE O/P EST LOW 20 MIN: CPT | Performed by: PHYSICIAN ASSISTANT

## 2021-11-10 PROCEDURE — 87086 URINE CULTURE/COLONY COUNT: CPT | Performed by: PHYSICIAN ASSISTANT

## 2021-11-10 PROCEDURE — 87210 SMEAR WET MOUNT SALINE/INK: CPT | Performed by: PHYSICIAN ASSISTANT

## 2021-11-10 PROCEDURE — 87088 URINE BACTERIA CULTURE: CPT | Performed by: PHYSICIAN ASSISTANT

## 2021-11-10 RX ORDER — PHENAZOPYRIDINE HYDROCHLORIDE 200 MG/1
200 TABLET, FILM COATED ORAL 3 TIMES DAILY PRN
Qty: 6 TABLET | Refills: 0 | Status: SHIPPED | OUTPATIENT
Start: 2021-11-10 | End: 2021-11-12

## 2021-11-10 NOTE — PROGRESS NOTES
Assessment & Plan     1. Dysuria  Started today. No evidence of UTI or vaginal infection. Additionally, do not suspect abdominal or pelvic infection. Advised fluids and using pyridium. UC is pending.  Discussed if development of fever or severe pain to be seen right away.  - UA reflex to Microscopic and Culture  - Wet preparation  - Urine Microscopic  - phenazopyridine (PYRIDIUM) 200 MG tablet; Take 1 tablet (200 mg) by mouth 3 times daily as needed for irritation  Dispense: 6 tablet; Refill: 0  - Urine Culture Aerobic Bacterial - lab collect; Future  - Urine Culture Aerobic Bacterial - lab collect        Return in about 3 days (around 11/13/2021), or if symptoms worsen or fail to improve.    Diagnosis and treatment plan was reviewed with patient and/or family.   We went over any labs or imaging. Discussed worsening symptoms or little to no relief despite treatment plan to follow-up with PCP or return to clinic.  Patient verbalizes understanding. All questions were addressed and answered.     Rebecca Gilbert PA-C  [facility]    CHIEF COMPLAINT:   Chief Complaint   Patient presents with     Urgent Care     poss UTI burning/pain      Subjective     Kee is a 26 year old female who presents to clinic today for evaluation of dysuria. Symptoms started today. Denies having fever, chills, flank pain, nausea, vomiting, hematuria or weakness. No new sexual partners. Vaginal discharge, itching or pain are not present. She did have abd pain one month ago and has had intermittent pain since that time. BM normal.     History reviewed. No pertinent past medical history.  History reviewed. No pertinent surgical history.  Social History     Tobacco Use     Smoking status: Never Smoker     Smokeless tobacco: Never Used   Substance Use Topics     Alcohol use: Not on file     Current Outpatient Medications   Medication     phenazopyridine (PYRIDIUM) 200 MG tablet     No current facility-administered  medications for this visit.     Allergies   Allergen Reactions     Pollen Extract Other (See Comments)       10 point ROS of systems were all negative except for pertinent positives noted in my HPI.      Exam:   /77   Pulse 78   Temp 98  F (36.7  C)   Resp 20   SpO2 98%   Constitutional: healthy, alert and no distress  ENT: MMM  Cardiovascular: RRR  Respiratory: CTA bilaterally, no rhonchi or rales  Gastrointestinal: soft and nontender. Some lower abd discomfort. No rebound, guarding or rigidity.  Back: No CVA tenderness B/L  Skin: no rashes      Results for orders placed or performed in visit on 11/10/21   UA reflex to Microscopic and Culture     Status: Abnormal    Specimen: Urine, Clean Catch   Result Value Ref Range    Color Urine Yellow Colorless, Straw, Light Yellow, Yellow    Appearance Urine Clear Clear    Glucose Urine Negative Negative mg/dL    Bilirubin Urine Negative Negative    Ketones Urine Negative Negative mg/dL    Specific Gravity Urine 1.020 1.003 - 1.035    Blood Urine Trace (A) Negative    pH Urine 6.5 5.0 - 7.0    Protein Albumin Urine Negative Negative mg/dL    Urobilinogen Urine 0.2 0.2, 1.0 E.U./dL    Nitrite Urine Negative Negative    Leukocyte Esterase Urine Negative Negative   Urine Microscopic     Status: Abnormal   Result Value Ref Range    Bacteria Urine Few (A) None Seen /HPF    RBC Urine 0-2 0-2 /HPF /HPF    WBC Urine 0-5 0-5 /HPF /HPF    Squamous Epithelials Urine Few (A) None Seen /LPF    Narrative    Urine Culture not indicated   Wet preparation     Status: Abnormal    Specimen: Vagina; Swab   Result Value Ref Range    Trichomonas Absent Absent    Yeast Absent Absent    Clue Cells Absent Absent    WBCs/high power field 1+ (A) None

## 2021-11-11 LAB — BACTERIA UR CULT: ABNORMAL

## 2022-01-12 ENCOUNTER — OFFICE VISIT (OUTPATIENT)
Dept: URGENT CARE | Facility: URGENT CARE | Age: 27
End: 2022-01-12
Payer: COMMERCIAL

## 2022-02-22 ENCOUNTER — APPOINTMENT (OUTPATIENT)
Dept: OPTOMETRY | Facility: CLINIC | Age: 27
End: 2022-02-22
Payer: COMMERCIAL

## 2022-02-22 ENCOUNTER — OFFICE VISIT (OUTPATIENT)
Dept: OPTOMETRY | Facility: CLINIC | Age: 27
End: 2022-02-22
Payer: COMMERCIAL

## 2022-02-22 DIAGNOSIS — H52.03 HYPEROPIA OF BOTH EYES: Primary | ICD-10-CM

## 2022-02-22 PROCEDURE — 92004 COMPRE OPH EXAM NEW PT 1/>: CPT | Performed by: OPTOMETRIST

## 2022-02-22 PROCEDURE — V2020 VISION SVCS FRAMES PURCHASES: HCPCS | Performed by: OPTOMETRIST

## 2022-02-22 PROCEDURE — 92015 DETERMINE REFRACTIVE STATE: CPT | Performed by: OPTOMETRIST

## 2022-02-22 ASSESSMENT — CONF VISUAL FIELD
OD_NORMAL: 1
OS_NORMAL: 1
METHOD: COUNTING FINGERS

## 2022-02-22 ASSESSMENT — SLIT LAMP EXAM - LIDS
COMMENTS: NORMAL
COMMENTS: NORMAL

## 2022-02-22 ASSESSMENT — EXTERNAL EXAM - RIGHT EYE: OD_EXAM: NORMAL

## 2022-02-22 ASSESSMENT — KERATOMETRY
OD_K1POWER_DIOPTERS: 43
METHOD_AUTO_MANUAL: AUTOMATED
OD_AXISANGLE_DEGREES: 77
OS_AXISANGLE_DEGREES: 81
OS_AXISANGLE2_DEGREES: 171
OS_K2POWER_DIOPTERS: 43.87
OD_AXISANGLE2_DEGREES: 167
OD_K2POWER_DIOPTERS: 44.62
OS_K1POWER_DIOPTERS: 42.75

## 2022-02-22 ASSESSMENT — VISUAL ACUITY
METHOD: SNELLEN - LINEAR
OD_SC: 20/20
OD_SC: 20/20
OS_SC: 20/20
OS_SC: 20/20

## 2022-02-22 ASSESSMENT — TONOMETRY
OS_IOP_MMHG: 12
OD_IOP_MMHG: 12
IOP_METHOD: APPLANATION

## 2022-02-22 ASSESSMENT — REFRACTION_MANIFEST
OD_CYLINDER: +0.25
OD_SPHERE: -1.75
OD_AXIS: 067
METHOD_AUTOREFRACTION: 1
OS_SPHERE: -1.75

## 2022-02-22 ASSESSMENT — REFRACTION_WEARINGRX
OS_SPHERE: +0.50
SPECS_TYPE: LOST GLASSES
OD_SPHERE: +0.50

## 2022-02-22 ASSESSMENT — CUP TO DISC RATIO
OD_RATIO: 0.25
OS_RATIO: 0.2

## 2022-02-22 ASSESSMENT — EXTERNAL EXAM - LEFT EYE: OS_EXAM: NORMAL

## 2022-02-22 NOTE — PROGRESS NOTES
Chief Complaint   Patient presents with     Annual Eye Exam      Struggling to read comfortably without glasses   Last Eye Exam: 2019  Dilated Previously: Declined dilation today    What are you currently using to see?  Glasses - lost them       Distance Vision Acuity: Satisfied with vision    Near Vision Acuity: Not satisfied     Eye Comfort: good  Do you use eye drops? : No  Occupation or Hobbies: In [employer] for [employer]     [place]          Medical, surgical and family histories reviewed and updated 2/22/2022.       OBJECTIVE: See Ophthalmology exam    ASSESSMENT:    ICD-10-CM    1. Hyperopia of both eyes  H52.03         PLAN:   Reading prescription     Ruthie Amanda OD

## 2022-02-22 NOTE — LETTER
2/22/2022         RE: Kee Jovel  [address]  [place] [address]        Dear Colleague,    Thank you for referring your patient, Kee Jovel, to the [facility]. Please see a copy of my visit note below.    Chief Complaint   Patient presents with     Annual Eye Exam      Struggling to read comfortably without glasses   Last Eye Exam: 2019  Dilated Previously: Declined dilation today    What are you currently using to see?  Glasses - lost them       Distance Vision Acuity: Satisfied with vision    Near Vision Acuity: Not satisfied     Eye Comfort: good  Do you use eye drops? 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       OBJECTIVE: See Ophthalmology exam    ASSESSMENT:    ICD-10-CM    1. Hyperopia of both eyes  H52.03         PLAN:   Reading prescription     Ruthie Amanda OD          Again, thank you for allowing me to participate in the care of your patient.        Sincerely,        Ruthie Amanda, OD

## 2022-03-04 ENCOUNTER — APPOINTMENT (OUTPATIENT)
Dept: OPTOMETRY | Facility: CLINIC | Age: 27
End: 2022-03-04
Payer: COMMERCIAL

## 2022-03-04 PROCEDURE — 92340 FIT SPECTACLES MONOFOCAL: CPT | Performed by: OPTOMETRIST

## 2022-07-10 ENCOUNTER — HEALTH MAINTENANCE LETTER (OUTPATIENT)
Age: 27
End: 2022-07-10

## 2022-09-11 ENCOUNTER — HEALTH MAINTENANCE LETTER (OUTPATIENT)
Age: 27
End: 2022-09-11

## 2023-07-29 ENCOUNTER — HEALTH MAINTENANCE LETTER (OUTPATIENT)
Age: 28
End: 2023-07-29

## 2024-06-27 ENCOUNTER — OFFICE VISIT (OUTPATIENT)
Dept: URGENT CARE | Facility: URGENT CARE | Age: 29
End: 2024-06-27
Payer: COMMERCIAL

## 2024-06-27 ENCOUNTER — HOSPITAL ENCOUNTER (OUTPATIENT)
Dept: CT IMAGING | Facility: CLINIC | Age: 29
Discharge: HOME | End: 2024-06-27
Payer: COMMERCIAL

## 2024-06-27 VITALS
DIASTOLIC BLOOD PRESSURE: 77 MMHG | WEIGHT: 218 LBS | SYSTOLIC BLOOD PRESSURE: 117 MMHG | HEART RATE: 88 BPM | OXYGEN SATURATION: 98 % | TEMPERATURE: 98.4 F

## 2024-06-27 DIAGNOSIS — M54.50 ACUTE RIGHT-SIDED LOW BACK PAIN WITHOUT SCIATICA: Primary | ICD-10-CM

## 2024-06-27 DIAGNOSIS — Z32.00 ENCOUNTER FOR PREGNANCY TEST, RESULT UNKNOWN: ICD-10-CM

## 2024-06-27 DIAGNOSIS — R10.84 ABDOMINAL PAIN, GENERALIZED: ICD-10-CM

## 2024-06-27 DIAGNOSIS — R11.0 NAUSEA: ICD-10-CM

## 2024-06-27 LAB
ALBUMIN SERPL-MCNC: 4 G/DL (ref 3.4–5)
ALBUMIN UR-MCNC: NEGATIVE MG/DL
ALP SERPL-CCNC: 51 U/L (ref 40–150)
ALT SERPL W P-5'-P-CCNC: 18 U/L (ref 0–50)
ANION GAP SERPL CALCULATED.3IONS-SCNC: 10 MMOL/L (ref 3–14)
APPEARANCE UR: CLEAR
AST SERPL W P-5'-P-CCNC: 28 U/L (ref 0–45)
BACTERIA #/AREA URNS HPF: ABNORMAL /HPF
BASOPHILS # BLD AUTO: 0 10E3/UL (ref 0–0.2)
BASOPHILS NFR BLD AUTO: 0 %
BILIRUB SERPL-MCNC: 0.5 MG/DL (ref 0.2–1.3)
BILIRUB UR QL STRIP: NEGATIVE
BUN SERPL-MCNC: 7 MG/DL (ref 7–30)
CALCIUM SERPL-MCNC: 9.7 MG/DL (ref 8.5–10.1)
CHLORIDE BLD-SCNC: 104 MMOL/L (ref 94–109)
CLUE CELLS: ABNORMAL
CO2 SERPL-SCNC: 28 MMOL/L (ref 20–32)
COLOR UR AUTO: YELLOW
CREAT SERPL-MCNC: 0.7 MG/DL (ref 0.52–1.04)
EGFRCR SERPLBLD CKD-EPI 2021: >90 ML/MIN/1.73M2
EOSINOPHIL # BLD AUTO: 0.1 10E3/UL (ref 0–0.7)
EOSINOPHIL NFR BLD AUTO: 1 %
ERYTHROCYTE [DISTWIDTH] IN BLOOD BY AUTOMATED COUNT: 14.4 % (ref 10–15)
GLUCOSE BLD-MCNC: 96 MG/DL (ref 70–99)
GLUCOSE UR STRIP-MCNC: NEGATIVE MG/DL
HCG INTACT+B SERPL-ACNC: <1 MIU/ML
HCG UR QL: NEGATIVE
HCT VFR BLD AUTO: 39 % (ref 35–47)
HGB BLD-MCNC: 12.7 G/DL (ref 11.7–15.7)
HGB UR QL STRIP: NEGATIVE
IMM GRANULOCYTES # BLD: 0 10E3/UL
IMM GRANULOCYTES NFR BLD: 0 %
KETONES UR STRIP-MCNC: NEGATIVE MG/DL
LEUKOCYTE ESTERASE UR QL STRIP: NEGATIVE
LYMPHOCYTES # BLD AUTO: 2.7 10E3/UL (ref 0.8–5.3)
LYMPHOCYTES NFR BLD AUTO: 31 %
MCH RBC QN AUTO: 27.7 PG (ref 26.5–33)
MCHC RBC AUTO-ENTMCNC: 32.6 G/DL (ref 31.5–36.5)
MCV RBC AUTO: 85 FL (ref 78–100)
MONOCYTES # BLD AUTO: 0.4 10E3/UL (ref 0–1.3)
MONOCYTES NFR BLD AUTO: 5 %
NEUTROPHILS # BLD AUTO: 5.4 10E3/UL (ref 1.6–8.3)
NEUTROPHILS NFR BLD AUTO: 63 %
NITRATE UR QL: NEGATIVE
PH UR STRIP: 6.5 [PH] (ref 5–7)
PLATELET # BLD AUTO: 370 10E3/UL (ref 150–450)
POTASSIUM BLD-SCNC: 4.2 MMOL/L (ref 3.4–5.3)
PROT SERPL-MCNC: 7.8 G/DL (ref 6.8–8.8)
RBC # BLD AUTO: 4.58 10E6/UL (ref 3.8–5.2)
RBC #/AREA URNS AUTO: ABNORMAL /HPF
SODIUM SERPL-SCNC: 142 MMOL/L (ref 135–145)
SP GR UR STRIP: 1.02 (ref 1–1.03)
SQUAMOUS #/AREA URNS AUTO: ABNORMAL /LPF
T VAGINALIS DNA SPEC QL NAA+PROBE: NOT DETECTED
TRICHOMONAS, WET PREP: ABNORMAL
UROBILINOGEN UR STRIP-ACNC: 0.2 E.U./DL
WBC # BLD AUTO: 8.5 10E3/UL (ref 4–11)
WBC #/AREA URNS AUTO: ABNORMAL /HPF
WBC'S/HIGH POWER FIELD, WET PREP: ABNORMAL
YEAST, WET PREP: ABNORMAL

## 2024-06-27 PROCEDURE — 81025 URINE PREGNANCY TEST: CPT

## 2024-06-27 PROCEDURE — 80053 COMPREHEN METABOLIC PANEL: CPT

## 2024-06-27 PROCEDURE — 87661 TRICHOMONAS VAGINALIS AMPLIF: CPT

## 2024-06-27 PROCEDURE — 250N000011 HC RX IP 250 OP 636

## 2024-06-27 PROCEDURE — 87210 SMEAR WET MOUNT SALINE/INK: CPT

## 2024-06-27 PROCEDURE — 81001 URINALYSIS AUTO W/SCOPE: CPT

## 2024-06-27 PROCEDURE — 87591 N.GONORRHOEAE DNA AMP PROB: CPT

## 2024-06-27 PROCEDURE — 87491 CHLMYD TRACH DNA AMP PROBE: CPT

## 2024-06-27 PROCEDURE — 74177 CT ABD & PELVIS W/CONTRAST: CPT

## 2024-06-27 PROCEDURE — 36415 COLL VENOUS BLD VENIPUNCTURE: CPT

## 2024-06-27 PROCEDURE — 84702 CHORIONIC GONADOTROPIN TEST: CPT

## 2024-06-27 PROCEDURE — 99213 OFFICE O/P EST LOW 20 MIN: CPT

## 2024-06-27 PROCEDURE — 85025 COMPLETE CBC W/AUTO DIFF WBC: CPT

## 2024-06-27 RX ORDER — ONDANSETRON 4 MG/1
4 TABLET, ORALLY DISINTEGRATING ORAL ONCE
Status: COMPLETED | OUTPATIENT
Start: 2024-06-27 | End: 2024-06-27

## 2024-06-27 RX ORDER — IOPAMIDOL 755 MG/ML
500 INJECTION, SOLUTION INTRAVASCULAR ONCE
Status: COMPLETED | OUTPATIENT
Start: 2024-06-27 | End: 2024-06-27

## 2024-06-27 RX ADMIN — ONDANSETRON 4 MG: 4 TABLET, ORALLY DISINTEGRATING ORAL at 13:21

## 2024-06-27 RX ADMIN — IOPAMIDOL 100 ML: 755 INJECTION, SOLUTION INTRAVENOUS at 15:45

## 2024-06-27 NOTE — PROGRESS NOTES
Assessment & Plan       ICD-10-CM    1. Back pain  M54.9 UA with Microscopic reflex to Culture - Clinic Collect     Wet prep - Clinic Collect     UA Microscopic with Reflex to Culture      2. Abdominal pain, generalized  R10.84 CT Abdomen Pelvis w Contrast     Comprehensive metabolic panel     CBC with platelets and differential     Chlamydia trachomatis/Neisseria gonorrhoeae by PCR - Clinic Collect     Trichomonas vaginalis DNA PCR     HCG qualitative     Comprehensive metabolic panel     CBC with platelets and differential     Trichomonas vaginalis DNA PCR     HCG qualitative      3. Nausea  R11.0 ondansetron (ZOFRAN ODT) ODT tab 4 mg      4. Encounter for pregnancy test, result unknown  Z32.00 HCG qualitative urine     HCG qualitative urine           Pain in the abdomen and pelvis, bulging sensation intermittently in R groin, LMP  I believe she said, but her periods are irregular. She did get nausea with both of her pregnancies. She had a  in , a vaginal birth in 2023, and a cholecystectomy in 2024, if I'm getting all these dates right. So given that she has severe abdominal pain (though no guarding) and it's somewhat RLQ with the bulging, somewhat generalized and RUQ, somewhat a feeling of pressure in her pelvic organs, I think we need imaging. Will quick check whether pregnant, and then will get ct abd/pelvis given the multiple recent pregnancies and procedures that could potentially lead to adhesions or infection. UA negative. Wet prep negative. Also gave some Zofran in office for nausea.     Next steps:   HCG urine pending, can't proceed with CT until we get that back.  Pending: serum HCG, GC/C, CMP, CBC, trichomonas  When HCG back if negative - CT abd/pelvis    UPT negative, will proceed with workup.     UA, HCG, CMP and CBC all unremarkable.     CT abd/pelvis unremarkable. Informed patient.     Follow up with primary care provider with any problems, questions or concerns or if  symptoms worsen or fail to improve. Patient agreed to plan and verbalized understanding.     García Sweet is a 28 year old female who presents to clinic today for the following health issues:  Chief Complaint   Patient presents with    Abdominal Pain     Lower abdominal pain radiates to lower back pain.  Taking tylenol for pain denies Urinary symptoms     Nausea     HPI    No vaginal or urinary symptoms, but has had pain in the pelvis for a couple days. Gallbladder removed in January. Abdomen is tender. Did have a  as well. Feels like pressure with her abdomen - like her organs are pushing down in the pelvis. Just 2 pregnancies. Second child was born in November. The c section was in . Bulging feeling on the LL abdomen as well, worse when standing, but not there right now. Yesterday is when things got worse.     Review of Systems    10 point ROS performed and negative except as noted in HPI.     Problem List:  2019: Gastroesophageal reflux disease  2017: Latent tuberculosis  2017: Acne vulgaris      No past medical history on file.    Social History     Tobacco Use    Smoking status: Never    Smokeless tobacco: Never   Substance Use Topics    Alcohol use: Not on file           Objective    /77 (BP Location: Right arm, Patient Position: Chair, Cuff Size: Adult Regular)   Pulse 88   Temp 98.4  F (36.9  C) (Tympanic)   Wt 98.9 kg (218 lb)   LMP 2024   SpO2 98%   Physical Exam   Constitutional:       General: Patient is not in acute distress.     Appearance: Normal appearance.   HENT:      Head: Normocephalic and atraumatic.      Right Ear: External ear normal.      Left Ear: External ear normal.      Nose: No congestion or rhinorrhea.      Mouth/Throat:      Mouth: Mucous membranes are moist.      Pharynx: Oropharynx is clear. No oropharyngeal exudate.   Eyes:      General: No scleral icterus.     Extraocular Movements: Extraocular movements intact.      Conjunctiva/sclera:  Conjunctivae normal.      Pupils: Pupils are equal, round, and reactive to light.   Pulmonary:      Effort: Pulmonary effort is normal.   Cardiovascular:      Regular heart rate  Abdominal:      General: Abdomen is flat.   Musculoskeletal:         General: No swelling or deformity. Normal range of motion.      Cervical back: Normal range of motion and neck supple.   Skin:     General: Skin is warm and dry.      Coloration: Skin is not jaundiced.      Findings: No bruising, lesion or rash.   Neurological:      General: No focal deficit present.      Mental Status: Patient is alert. Mental status is at baseline.   Psychiatric:         Mood and Affect: Mood normal.         Behavior: Behavior normal.         Thought Content: Thought content normal.       Kavya Duran MD

## 2024-06-27 NOTE — PATIENT INSTRUCTIONS
Take 1000 mg tylenol  4 hours later take 600 mg ibuprofen  4 hours later take the tylenol again  4 hours later take the ibuprofen  And so on....    If you take these scheduled every 4 hours (so each one is only taken every 8 hours but you're taking one or the other every 4 hours) they work as well as an opioid medicine but don't lead to tolerance.

## 2024-06-28 LAB
C TRACH DNA SPEC QL PROBE+SIG AMP: NEGATIVE
N GONORRHOEA DNA SPEC QL NAA+PROBE: NEGATIVE

## 2024-07-13 ENCOUNTER — HEALTH MAINTENANCE LETTER (OUTPATIENT)
Age: 29
End: 2024-07-13

## 2025-07-19 ENCOUNTER — HEALTH MAINTENANCE LETTER (OUTPATIENT)
Age: 30
End: 2025-07-19